# Patient Record
Sex: FEMALE | Race: WHITE | Employment: FULL TIME | URBAN - METROPOLITAN AREA
[De-identification: names, ages, dates, MRNs, and addresses within clinical notes are randomized per-mention and may not be internally consistent; named-entity substitution may affect disease eponyms.]

---

## 2023-01-16 ENCOUNTER — OFFICE VISIT (OUTPATIENT)
Dept: UROLOGY | Age: 28
End: 2023-01-16
Payer: COMMERCIAL

## 2023-01-16 DIAGNOSIS — N39.0 RECURRENT UTI: Primary | ICD-10-CM

## 2023-01-16 DIAGNOSIS — R82.90 MALODOROUS URINE: ICD-10-CM

## 2023-01-16 LAB
BILIRUBIN, URINE, POC: NEGATIVE
BLOOD URINE, POC: NEGATIVE
GLUCOSE URINE, POC: NEGATIVE
KETONES, URINE, POC: NEGATIVE
LEUKOCYTE ESTERASE, URINE, POC: NORMAL
NITRITE, URINE, POC: NEGATIVE
PH, URINE, POC: 6.5 (ref 4.6–8)
PROTEIN,URINE, POC: NEGATIVE
SPECIFIC GRAVITY, URINE, POC: 1.02 (ref 1–1.03)
URINALYSIS CLARITY, POC: NORMAL
URINALYSIS COLOR, POC: NORMAL
UROBILINOGEN, POC: NORMAL

## 2023-01-16 PROCEDURE — 81003 URINALYSIS AUTO W/O SCOPE: CPT | Performed by: NURSE PRACTITIONER

## 2023-01-16 PROCEDURE — 99204 OFFICE O/P NEW MOD 45 MIN: CPT | Performed by: NURSE PRACTITIONER

## 2023-01-16 ASSESSMENT — ENCOUNTER SYMPTOMS
RESPIRATORY NEGATIVE: 1
EYES NEGATIVE: 1
CONSTIPATION: 1
BACK PAIN: 1

## 2023-01-16 NOTE — PROGRESS NOTES
[facility]  [address] [place] [address]  [address], [address] [facility]  [phone]          Jana Back  : 1995    Chief Complaint   Patient presents with    New Patient     Recurrent UTI          HPI     Jana Back is a 28 y.o. female referred for recurrent E Coli UTI. This started in . Had 6 UTIs. Sx typically are dysuria and freq. Over the past 6 months, she has only had malodorous urine and cloudy urine. Usually first thing in the morning. Sx clear by the afternoon. She has tried antibiotics, vit C, d-mannose, pre/probiotics, and cranberry. NO sign relief. She is now drinking 80 oz of water daily. This has helped. . Regular menses. She is a newly wed. She is actively trying to to pregnant. She was NOT previously sexually active. Unsure if sex always triggers UTI. No personal or family h/o renal stones or urological CA. History reviewed. No pertinent past medical history. History reviewed. No pertinent surgical history. Current Outpatient Medications   Medication Sig Dispense Refill    Magnesium Salicylate 559 MG TABS Take by mouth      Prenatal Vit-Fe Fumarate-FA (PRENATAL VITAMIN PO) Take by mouth       No current facility-administered medications for this visit.      Allergies   Allergen Reactions    Penicillins      Social History     Socioeconomic History    Marital status:      Spouse name: Not on file    Number of children: Not on file    Years of education: Not on file    Highest education level: Not on file   Occupational History    Not on file   Tobacco Use    Smoking status: Never    Smokeless tobacco: Never   Substance and Sexual Activity    Alcohol use: Not on file    Drug use: Not on file    Sexual activity: Not on file   Other Topics Concern    Not on file   Social History Narrative    Not on file     Social Determinants of Health     Financial Resource Strain: Not on file   Food Insecurity: Not on file   Transportation Needs: Not on file Physical Activity: Not on file   Stress: Not on file   Social Connections: Not on file   Intimate Partner Violence: Not on file   Housing Stability: Not on file     History reviewed. No pertinent family history. Review of Systems  Skin: Negative skin ROS  Eyes: Eyes negative  ENT: Positive for dry mouth. Respiratory: Respiratory negative  Cardiovascular: Neg cardio ROS  GI: Positive for constipation. Genitourinary: Positive for urinary burning, recurrent UTIs, urgency, frequent urination, incomplete emptying and vaginal pain. Number of pregnancies: 0. Number of births: 0.  Musculoskeletal: Positive for back pain and neck pain. Neurological: Neg neuro ROS  Psychological: Neg psych ROS  Endocrine: Positive for thirst, excessive urination and fatigue. Hem/Lymphatic: Hematologic/lymphatic negative    Urinalysis  UA - Dipstick  Results for orders placed or performed in visit on 01/16/23   AMB POC URINALYSIS DIP STICK AUTO W/O MICRO   Result Value Ref Range    Color (UA POC)      Clarity (UA POC)      Glucose, Urine, POC Negative Negative    Bilirubin, Urine, POC Negative Negative    KETONES, Urine, POC Negative Negative    Specific Gravity, Urine, POC 1.020 1.001 - 1.035    Blood (UA POC) Negative Negative    pH, Urine, POC 6.5 4.6 - 8.0    Protein, Urine, POC Negative Negative    Urobilinogen, POC 0.2 mg/dL     Nitrite, Urine, POC Negative Negative    Leukocyte Esterase, Urine, POC Trace Negative       UA - Micro  WBC - 0  RBC - 0  Bacteria - 0  Epith - 0    PHYSICAL EXAM    General appearance - well appearing and in no distress  Mental status - alert, oriented to person, place, and time  Neck - supple, no significant adenopathy  Chest/Lung-  Quiet, even and easy respiratory effort without use of accessory muscles  Skin - normal coloration and turgor, no rashes      Assessment and Plan    ICD-10-CM    1.  Recurrent UTI  N39.0 AMB POC URINALYSIS DIP STICK AUTO W/O MICRO     Culture, Urine     US RETROPERITONEAL COMPLETE      2. Malodorous urine  R82.90           Recurrent UTI- urine normal today. Culture pending. HOLD on antibiotic today. Patient will begin daily suppressive antibiotic based on results (?keflex). Potential side effects were discussed. The plan will be to hopefully be able to wean this in 3-4 months to 3 times weekly and eventually stop it altogether. Renal u/s has been ordered to ensure no large stone as a source of UTI's. I have educated patient to behavioral changes that can decrease the frequency of any urinary infection. These include eliminating constipation, front to back wiping, frequent voiding to keep bladder volumes low, double voiding, avoid soaking in water (including baths, pools, hot tubs), use of cranberry products, and use of probiotics. I encouraged consuming minimum of 64 oz of water daily. I also recommend avoiding consumption of sugary beverages and other known bladder irritants. Malodorous urine- add vit c to help. Follow up pending culture and BEL results. Advised to call sooner if needed. Florin Verdin, TREVORP, APRN - CNP  Dr. Kate Albright is supervising physician today and he approves plan of care.

## 2023-01-19 LAB
BACTERIA SPEC CULT: NORMAL
SERVICE CMNT-IMP: NORMAL

## 2023-01-20 ENCOUNTER — HOSPITAL ENCOUNTER (OUTPATIENT)
Dept: ULTRASOUND IMAGING | Age: 28
Discharge: HOME | End: 2023-01-23

## 2023-01-20 DIAGNOSIS — N39.0 RECURRENT UTI: ICD-10-CM
